# Patient Record
Sex: MALE | Race: BLACK OR AFRICAN AMERICAN | NOT HISPANIC OR LATINO | Employment: UNEMPLOYED | URBAN - METROPOLITAN AREA
[De-identification: names, ages, dates, MRNs, and addresses within clinical notes are randomized per-mention and may not be internally consistent; named-entity substitution may affect disease eponyms.]

---

## 2020-11-23 PROBLEM — F10.121 ALCOHOL INTOXICATION DELIRIUM: Status: ACTIVE | Noted: 2020-11-23

## 2020-11-24 PROBLEM — R56.9 SEIZURE: Status: ACTIVE | Noted: 2020-11-24

## 2020-11-24 PROBLEM — E87.6 HYPOKALEMIA: Status: ACTIVE | Noted: 2020-11-24

## 2020-11-24 PROBLEM — F10.929 ALCOHOL INTOXICATION: Status: ACTIVE | Noted: 2020-11-23

## 2020-11-24 PROBLEM — F10.229 ALCOHOL DEPENDENCE WITH ACUTE ALCOHOLIC INTOXICATION: Status: ACTIVE | Noted: 2020-11-23

## 2020-11-25 PROBLEM — E83.42 HYPOMAGNESEMIA: Status: ACTIVE | Noted: 2020-11-25

## 2022-07-08 ENCOUNTER — HOSPITAL ENCOUNTER (INPATIENT)
Facility: HOSPITAL | Age: 42
LOS: 1 days | Discharge: PSYCHIATRIC OR SUBSTANCE USE FACILITY | DRG: 896 | End: 2022-07-09
Attending: EMERGENCY MEDICINE | Admitting: STUDENT IN AN ORGANIZED HEALTH CARE EDUCATION/TRAINING PROGRAM
Payer: MEDICAID

## 2022-07-08 DIAGNOSIS — N17.9 ACUTE RENAL FAILURE, UNSPECIFIED ACUTE RENAL FAILURE TYPE: ICD-10-CM

## 2022-07-08 DIAGNOSIS — F23 ACUTE PSYCHOSIS: Primary | ICD-10-CM

## 2022-07-08 DIAGNOSIS — E87.6 HYPOKALEMIA: ICD-10-CM

## 2022-07-08 DIAGNOSIS — R07.81 RIB PAIN: ICD-10-CM

## 2022-07-08 DIAGNOSIS — R74.01 TRANSAMINITIS: ICD-10-CM

## 2022-07-08 DIAGNOSIS — R07.9 CHEST PAIN: ICD-10-CM

## 2022-07-08 DIAGNOSIS — M62.82 NON-TRAUMATIC RHABDOMYOLYSIS: ICD-10-CM

## 2022-07-08 PROBLEM — F10.21 HISTORY OF ALCOHOL DEPENDENCE: Status: ACTIVE | Noted: 2022-07-08

## 2022-07-08 LAB
ALBUMIN SERPL BCP-MCNC: 4.4 G/DL (ref 3.5–5.2)
ALP SERPL-CCNC: 87 U/L (ref 55–135)
ALT SERPL W/O P-5'-P-CCNC: 52 U/L (ref 10–44)
AMPHET+METHAMPHET UR QL: ABNORMAL
ANION GAP SERPL CALC-SCNC: 20 MMOL/L (ref 8–16)
APAP SERPL-MCNC: <3 UG/ML (ref 10–20)
AST SERPL-CCNC: 104 U/L (ref 10–40)
BACTERIA #/AREA URNS AUTO: NORMAL /HPF
BARBITURATES UR QL SCN>200 NG/ML: NEGATIVE
BASOPHILS # BLD AUTO: 0.06 K/UL (ref 0–0.2)
BASOPHILS NFR BLD: 0.5 % (ref 0–1.9)
BENZODIAZ UR QL SCN>200 NG/ML: NEGATIVE
BILIRUB DIRECT SERPL-MCNC: 0.5 MG/DL (ref 0.1–0.3)
BILIRUB SERPL-MCNC: 1.4 MG/DL (ref 0.1–1)
BILIRUB UR QL STRIP: NEGATIVE
BUN SERPL-MCNC: 50 MG/DL (ref 6–20)
BZE UR QL SCN: NEGATIVE
CALCIUM SERPL-MCNC: 8.8 MG/DL (ref 8.7–10.5)
CANNABINOIDS UR QL SCN: ABNORMAL
CHLORIDE SERPL-SCNC: 95 MMOL/L (ref 95–110)
CK SERPL-CCNC: 3198 U/L (ref 20–200)
CLARITY UR REFRACT.AUTO: CLEAR
CO2 SERPL-SCNC: 22 MMOL/L (ref 23–29)
COLOR UR AUTO: YELLOW
CREAT SERPL-MCNC: 3.8 MG/DL (ref 0.5–1.4)
CREAT UR-MCNC: 220 MG/DL (ref 23–375)
CREAT UR-MCNC: 220 MG/DL (ref 23–375)
CTP QC/QA: YES
DIFFERENTIAL METHOD: ABNORMAL
EOSINOPHIL # BLD AUTO: 0 K/UL (ref 0–0.5)
EOSINOPHIL NFR BLD: 0.1 % (ref 0–8)
ERYTHROCYTE [DISTWIDTH] IN BLOOD BY AUTOMATED COUNT: 11.9 % (ref 11.5–14.5)
EST. GFR  (AFRICAN AMERICAN): 21.4 ML/MIN/1.73 M^2
EST. GFR  (NON AFRICAN AMERICAN): 18.5 ML/MIN/1.73 M^2
ETHANOL SERPL-MCNC: <10 MG/DL
FERRITIN SERPL-MCNC: 243 NG/ML (ref 20–300)
FOLATE SERPL-MCNC: 10.6 NG/ML (ref 4–24)
GLUCOSE SERPL-MCNC: 71 MG/DL (ref 70–110)
GLUCOSE UR QL STRIP: NEGATIVE
HCT VFR BLD AUTO: 37.5 % (ref 40–54)
HCV AB SERPL QL IA: NEGATIVE
HGB BLD-MCNC: 13 G/DL (ref 14–18)
HGB UR QL STRIP: ABNORMAL
HIV 1+2 AB+HIV1 P24 AG SERPL QL IA: NEGATIVE
HYALINE CASTS UR QL AUTO: 1 /LPF
IMM GRANULOCYTES # BLD AUTO: 0.11 K/UL (ref 0–0.04)
IMM GRANULOCYTES NFR BLD AUTO: 0.8 % (ref 0–0.5)
IRON SERPL-MCNC: 66 UG/DL (ref 45–160)
KETONES UR QL STRIP: ABNORMAL
LEUKOCYTE ESTERASE UR QL STRIP: NEGATIVE
LYMPHOCYTES # BLD AUTO: 1.3 K/UL (ref 1–4.8)
LYMPHOCYTES NFR BLD: 9.8 % (ref 18–48)
MCH RBC QN AUTO: 30.5 PG (ref 27–31)
MCHC RBC AUTO-ENTMCNC: 34.7 G/DL (ref 32–36)
MCV RBC AUTO: 88 FL (ref 82–98)
METHADONE UR QL SCN>300 NG/ML: NEGATIVE
MICROSCOPIC COMMENT: NORMAL
MONOCYTES # BLD AUTO: 1 K/UL (ref 0.3–1)
MONOCYTES NFR BLD: 7.6 % (ref 4–15)
NEUTROPHILS # BLD AUTO: 10.8 K/UL (ref 1.8–7.7)
NEUTROPHILS NFR BLD: 81.2 % (ref 38–73)
NITRITE UR QL STRIP: NEGATIVE
NRBC BLD-RTO: 0 /100 WBC
OPIATES UR QL SCN: NEGATIVE
PCP UR QL SCN>25 NG/ML: NEGATIVE
PH UR STRIP: 5 [PH] (ref 5–8)
PLATELET # BLD AUTO: 305 K/UL (ref 150–450)
PMV BLD AUTO: 8.8 FL (ref 9.2–12.9)
POTASSIUM SERPL-SCNC: 3.1 MMOL/L (ref 3.5–5.1)
PROT SERPL-MCNC: 7.7 G/DL (ref 6–8.4)
PROT UR QL STRIP: ABNORMAL
PROT UR-MCNC: 53 MG/DL (ref 0–15)
PROT/CREAT UR: 0.24 MG/G{CREAT} (ref 0–0.2)
RBC # BLD AUTO: 4.26 M/UL (ref 4.6–6.2)
RBC #/AREA URNS AUTO: 1 /HPF (ref 0–4)
SARS-COV-2 RDRP RESP QL NAA+PROBE: NEGATIVE
SATURATED IRON: 22 % (ref 20–50)
SODIUM SERPL-SCNC: 137 MMOL/L (ref 136–145)
SODIUM UR-SCNC: 59 MMOL/L (ref 20–250)
SP GR UR STRIP: 1.02 (ref 1–1.03)
TOTAL IRON BINDING CAPACITY: 299 UG/DL (ref 250–450)
TOXICOLOGY INFORMATION: ABNORMAL
TRANSFERRIN SERPL-MCNC: 202 MG/DL (ref 200–375)
TSH SERPL DL<=0.005 MIU/L-ACNC: 0.94 UIU/ML (ref 0.4–4)
URN SPEC COLLECT METH UR: ABNORMAL
VIT B12 SERPL-MCNC: 338 PG/ML (ref 210–950)
WBC # BLD AUTO: 13.23 K/UL (ref 3.9–12.7)
WBC #/AREA URNS AUTO: 4 /HPF (ref 0–5)

## 2022-07-08 PROCEDURE — U0002 COVID-19 LAB TEST NON-CDC: HCPCS | Performed by: PHYSICIAN ASSISTANT

## 2022-07-08 PROCEDURE — 25000003 PHARM REV CODE 250: Performed by: PHYSICIAN ASSISTANT

## 2022-07-08 PROCEDURE — 80307 DRUG TEST PRSMV CHEM ANLYZR: CPT

## 2022-07-08 PROCEDURE — 87389 HIV-1 AG W/HIV-1&-2 AB AG IA: CPT | Performed by: PHYSICIAN ASSISTANT

## 2022-07-08 PROCEDURE — 86803 HEPATITIS C AB TEST: CPT | Performed by: PHYSICIAN ASSISTANT

## 2022-07-08 PROCEDURE — 80053 COMPREHEN METABOLIC PANEL: CPT | Performed by: PHYSICIAN ASSISTANT

## 2022-07-08 PROCEDURE — 85025 COMPLETE CBC W/AUTO DIFF WBC: CPT | Performed by: PHYSICIAN ASSISTANT

## 2022-07-08 PROCEDURE — 99223 1ST HOSP IP/OBS HIGH 75: CPT | Mod: ,,, | Performed by: STUDENT IN AN ORGANIZED HEALTH CARE EDUCATION/TRAINING PROGRAM

## 2022-07-08 PROCEDURE — 80307 DRUG TEST PRSMV CHEM ANLYZR: CPT | Performed by: PHYSICIAN ASSISTANT

## 2022-07-08 PROCEDURE — 82607 VITAMIN B-12: CPT | Performed by: STUDENT IN AN ORGANIZED HEALTH CARE EDUCATION/TRAINING PROGRAM

## 2022-07-08 PROCEDURE — 25000003 PHARM REV CODE 250: Performed by: INTERNAL MEDICINE

## 2022-07-08 PROCEDURE — 99223 PR INITIAL HOSPITAL CARE,LEVL III: ICD-10-PCS | Mod: ,,, | Performed by: STUDENT IN AN ORGANIZED HEALTH CARE EDUCATION/TRAINING PROGRAM

## 2022-07-08 PROCEDURE — 84300 ASSAY OF URINE SODIUM: CPT | Performed by: STUDENT IN AN ORGANIZED HEALTH CARE EDUCATION/TRAINING PROGRAM

## 2022-07-08 PROCEDURE — 99291 CRITICAL CARE FIRST HOUR: CPT

## 2022-07-08 PROCEDURE — 82728 ASSAY OF FERRITIN: CPT | Performed by: STUDENT IN AN ORGANIZED HEALTH CARE EDUCATION/TRAINING PROGRAM

## 2022-07-08 PROCEDURE — 99291 CRITICAL CARE FIRST HOUR: CPT | Mod: CS,,, | Performed by: PHYSICIAN ASSISTANT

## 2022-07-08 PROCEDURE — 82746 ASSAY OF FOLIC ACID SERUM: CPT | Performed by: STUDENT IN AN ORGANIZED HEALTH CARE EDUCATION/TRAINING PROGRAM

## 2022-07-08 PROCEDURE — 99222 PR INITIAL HOSPITAL CARE,LEVL II: ICD-10-PCS | Mod: AF,HB,, | Performed by: PSYCHIATRY & NEUROLOGY

## 2022-07-08 PROCEDURE — 84156 ASSAY OF PROTEIN URINE: CPT | Performed by: STUDENT IN AN ORGANIZED HEALTH CARE EDUCATION/TRAINING PROGRAM

## 2022-07-08 PROCEDURE — 80143 DRUG ASSAY ACETAMINOPHEN: CPT | Performed by: PHYSICIAN ASSISTANT

## 2022-07-08 PROCEDURE — 81001 URINALYSIS AUTO W/SCOPE: CPT | Performed by: PHYSICIAN ASSISTANT

## 2022-07-08 PROCEDURE — 99291 PR CRITICAL CARE, E/M 30-74 MINUTES: ICD-10-PCS | Mod: CS,,, | Performed by: PHYSICIAN ASSISTANT

## 2022-07-08 PROCEDURE — 84466 ASSAY OF TRANSFERRIN: CPT | Performed by: STUDENT IN AN ORGANIZED HEALTH CARE EDUCATION/TRAINING PROGRAM

## 2022-07-08 PROCEDURE — 11000001 HC ACUTE MED/SURG PRIVATE ROOM

## 2022-07-08 PROCEDURE — 25000003 PHARM REV CODE 250

## 2022-07-08 PROCEDURE — 99222 1ST HOSP IP/OBS MODERATE 55: CPT | Mod: AF,HB,, | Performed by: PSYCHIATRY & NEUROLOGY

## 2022-07-08 PROCEDURE — 84443 ASSAY THYROID STIM HORMONE: CPT | Performed by: PHYSICIAN ASSISTANT

## 2022-07-08 PROCEDURE — 82077 ASSAY SPEC XCP UR&BREATH IA: CPT | Performed by: PHYSICIAN ASSISTANT

## 2022-07-08 PROCEDURE — 82248 BILIRUBIN DIRECT: CPT | Performed by: STUDENT IN AN ORGANIZED HEALTH CARE EDUCATION/TRAINING PROGRAM

## 2022-07-08 PROCEDURE — 84425 ASSAY OF VITAMIN B-1: CPT | Performed by: STUDENT IN AN ORGANIZED HEALTH CARE EDUCATION/TRAINING PROGRAM

## 2022-07-08 PROCEDURE — 82550 ASSAY OF CK (CPK): CPT | Performed by: PHYSICIAN ASSISTANT

## 2022-07-08 RX ORDER — GLUCAGON 1 MG
1 KIT INJECTION
Status: DISCONTINUED | OUTPATIENT
Start: 2022-07-08 | End: 2022-07-09 | Stop reason: HOSPADM

## 2022-07-08 RX ORDER — SODIUM CHLORIDE 0.9 % (FLUSH) 0.9 %
10 SYRINGE (ML) INJECTION EVERY 12 HOURS PRN
Status: DISCONTINUED | OUTPATIENT
Start: 2022-07-08 | End: 2022-07-09 | Stop reason: HOSPADM

## 2022-07-08 RX ORDER — IBUPROFEN 200 MG
24 TABLET ORAL
Status: DISCONTINUED | OUTPATIENT
Start: 2022-07-08 | End: 2022-07-09 | Stop reason: HOSPADM

## 2022-07-08 RX ORDER — LORAZEPAM 1 MG/1
2 TABLET ORAL EVERY 6 HOURS PRN
Status: DISCONTINUED | OUTPATIENT
Start: 2022-07-08 | End: 2022-07-09 | Stop reason: HOSPADM

## 2022-07-08 RX ORDER — ACETAMINOPHEN 325 MG/1
650 TABLET ORAL
Status: COMPLETED | OUTPATIENT
Start: 2022-07-08 | End: 2022-07-08

## 2022-07-08 RX ORDER — SODIUM CHLORIDE 9 MG/ML
INJECTION, SOLUTION INTRAVENOUS CONTINUOUS
Status: DISCONTINUED | OUTPATIENT
Start: 2022-07-08 | End: 2022-07-09 | Stop reason: HOSPADM

## 2022-07-08 RX ORDER — ACETAMINOPHEN 325 MG/1
650 TABLET ORAL EVERY 4 HOURS PRN
Status: DISCONTINUED | OUTPATIENT
Start: 2022-07-08 | End: 2022-07-09 | Stop reason: HOSPADM

## 2022-07-08 RX ORDER — NALOXONE HCL 0.4 MG/ML
0.02 VIAL (ML) INJECTION
Status: DISCONTINUED | OUTPATIENT
Start: 2022-07-08 | End: 2022-07-09 | Stop reason: HOSPADM

## 2022-07-08 RX ORDER — OLANZAPINE 10 MG/2ML
10 INJECTION, POWDER, FOR SOLUTION INTRAMUSCULAR ONCE AS NEEDED
Status: DISCONTINUED | OUTPATIENT
Start: 2022-07-08 | End: 2022-07-08

## 2022-07-08 RX ORDER — IBUPROFEN 200 MG
16 TABLET ORAL
Status: DISCONTINUED | OUTPATIENT
Start: 2022-07-08 | End: 2022-07-09 | Stop reason: HOSPADM

## 2022-07-08 RX ORDER — OLANZAPINE 5 MG/1
5 TABLET, ORALLY DISINTEGRATING ORAL EVERY 8 HOURS PRN
Status: DISCONTINUED | OUTPATIENT
Start: 2022-07-08 | End: 2022-07-09 | Stop reason: HOSPADM

## 2022-07-08 RX ORDER — FOLIC ACID 1 MG/1
1 TABLET ORAL DAILY
Status: DISCONTINUED | OUTPATIENT
Start: 2022-07-08 | End: 2022-07-08

## 2022-07-08 RX ORDER — THIAMINE HCL 100 MG
100 TABLET ORAL DAILY
Status: DISCONTINUED | OUTPATIENT
Start: 2022-07-08 | End: 2022-07-08

## 2022-07-08 RX ORDER — ONDANSETRON 8 MG/1
8 TABLET, ORALLY DISINTEGRATING ORAL EVERY 8 HOURS PRN
Status: DISCONTINUED | OUTPATIENT
Start: 2022-07-08 | End: 2022-07-08

## 2022-07-08 RX ORDER — OLANZAPINE 10 MG/2ML
2.5 INJECTION, POWDER, FOR SOLUTION INTRAMUSCULAR ONCE AS NEEDED
Status: DISCONTINUED | OUTPATIENT
Start: 2022-07-08 | End: 2022-07-09 | Stop reason: HOSPADM

## 2022-07-08 RX ORDER — POTASSIUM CHLORIDE 20 MEQ/1
40 TABLET, EXTENDED RELEASE ORAL ONCE
Status: COMPLETED | OUTPATIENT
Start: 2022-07-08 | End: 2022-07-08

## 2022-07-08 RX ADMIN — SODIUM CHLORIDE: 0.9 INJECTION, SOLUTION INTRAVENOUS at 04:07

## 2022-07-08 RX ADMIN — SODIUM CHLORIDE 1000 ML: 0.9 INJECTION, SOLUTION INTRAVENOUS at 12:07

## 2022-07-08 RX ADMIN — POTASSIUM CHLORIDE 40 MEQ: 1500 TABLET, EXTENDED RELEASE ORAL at 12:07

## 2022-07-08 RX ADMIN — ACETAMINOPHEN 650 MG: 325 TABLET ORAL at 12:07

## 2022-07-08 NOTE — ED NOTES
Bed: [facility]  Expected date:   Expected time:   Means of arrival:   Comments:

## 2022-07-08 NOTE — ED NOTES
Patient transferred from [facility] to ED bed 20.  Changed into blue paper scrubs and scanned by security at bedside.  Patient is AAOX4, in NAD, denies SI/HI. IM MD at bedside speaking to patient.  Calm and cooperative at this time. Belongings documented and locked in chacon closet and safe in [place] office. Sitter at bedside in direct visual contact.

## 2022-07-08 NOTE — ED NOTES
Patient identifiers verified and correct for MR Morales  C/C: rib pain SEE NN  APPEARANCE: awake and alert in NAD.  SKIN: warm, dry and intact. No breakdown or bruising.  MUSCULOSKELETAL: Patient moving all extremities spontaneously, no obvious swelling or deformities noted. Ambulates independently.  RESPIRATORY: Denies shortness of breath.Respirations unlabored.   CARDIAC: Denies CP, 2+ distal pulses; no peripheral edema  ABDOMEN: S/ND/NT, Denies nausea  : voids spontaneously, denies difficulty  Neurologic: AAO x 4; follows commands equal strength in all extremities; denies numbness/tingling. Denies dizziness  Denies weakness

## 2022-07-08 NOTE — HOSPITAL COURSE
7/8/22: Patient was seen in ED; pleasantly psychotic, cooperative with care, c/o abdominal/flank pain as well as anxiety 2/2 paranoia. Alert and oriented x3.

## 2022-07-08 NOTE — CONSULTS
Pt seen and staffed please see note below.    -Please contact ON CALL psychiatry service for any acute issues that may arise.    KAIT PALMA MD   Department of Psychiatry   [facility]  7/8/2022 4:28 PM

## 2022-07-08 NOTE — SUBJECTIVE & OBJECTIVE
History reviewed. No pertinent past medical history.    History reviewed. No pertinent surgical history.    Review of patient's allergies indicates:  No Known Allergies    No current facility-administered medications on file prior to encounter.     No current outpatient medications on file prior to encounter.     Family History       Family history is unknown by patient.          Tobacco Use    Smoking status: Never Smoker    Smokeless tobacco: Never Used   Substance and Sexual Activity    Alcohol use: Not Currently     Comment: Quit 3 years ago    Drug use: Yes     Types: Marijuana    Sexual activity: Not on file     Review of Systems   Constitutional:  Positive for activity change and fatigue.   HENT:  Negative for sinus pain and sore throat.    Eyes:  Negative for pain and visual disturbance.   Respiratory:  Negative for chest tightness and shortness of breath.    Cardiovascular:  Negative for chest pain and leg swelling.   Gastrointestinal:  Positive for abdominal pain and nausea.   Genitourinary:  Positive for flank pain. Negative for difficulty urinating.   Musculoskeletal:         R chest wall to flank pain   Skin:  Negative for color change and rash.   Neurological:  Negative for dizziness.   Psychiatric/Behavioral:  Positive for hallucinations. Negative for self-injury. The patient is not nervous/anxious.    Objective:     Vital Signs (Most Recent):  Temp: 98.3 °F (36.8 °C) (07/08/22 1342)  Pulse: 60 (07/08/22 1342)  Resp: 16 (07/08/22 1342)  BP: (!) 141/65 (07/08/22 1342)  SpO2: 100 % (07/08/22 1342)   Vital Signs (24h Range):  Temp:  [98.3 °F (36.8 °C)-98.4 °F (36.9 °C)] 98.3 °F (36.8 °C)  Pulse:  [60-82] 60  Resp:  [16-18] 16  SpO2:  [99 %-100 %] 100 %  BP: (132-141)/(65-66) 141/65     Weight: 61.7 kg (136 lb)  There is no height or weight on file to calculate BMI.    Physical Exam  Constitutional:       General: He is not in acute distress.     Appearance: He is not ill-appearing or toxic-appearing.    HENT:      Head: Normocephalic and atraumatic.      Right Ear: External ear normal.      Left Ear: External ear normal.      Nose: Nose normal.      Mouth/Throat:      Pharynx: Oropharynx is clear.   Eyes:      General: No scleral icterus.     Conjunctiva/sclera: Conjunctivae normal.   Cardiovascular:      Rate and Rhythm: Normal rate and regular rhythm.      Pulses: Normal pulses.      Heart sounds: Normal heart sounds.   Pulmonary:      Effort: Pulmonary effort is normal.      Breath sounds: Normal breath sounds.   Abdominal:      General: Bowel sounds are normal.      Palpations: Abdomen is soft.      Tenderness: There is abdominal tenderness. There is no guarding.      Comments: RUQ tenderness   Musculoskeletal:         General: Tenderness present. Normal range of motion.      Cervical back: Normal range of motion.      Comments: TTP at R axilla and ribs   Skin:     General: Skin is warm and dry.   Neurological:      General: No focal deficit present.   Psychiatric:      Comments: Hallucinations           Significant Labs: Bilirubin:   Recent Labs   Lab 07/08/22  0921 07/08/22  1239   BILIDIR  --  0.5*   BILITOT 1.4*  --      BMP:   Recent Labs   Lab 07/08/22  0921   GLU 71      K 3.1*   CL 95   CO2 22*   BUN 50*   CREATININE 3.8*   CALCIUM 8.8         Significant Imaging: I have reviewed all pertinent imaging results/findings within the past 24 hours.

## 2022-07-08 NOTE — ASSESSMENT & PLAN NOTE
ASSESSMENT     Boo Morales is a 41 y.o. male with a past psychiatric history of alcohol use disorder, who presented to the [facility] due to rib pain and paranoia.    IMPRESSION  Per collateral and interview, the patient's paranoid delusions and auditory hallucinations have had an acute onset. He does not have primary psychiatric history but does have a history of substance use, and toxicology screen was positive for THC and amphetamines today. 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 He may require symptomatic PRNs as below for safety, but we recommend deferring scheduled antipsychotics until he has been given more time for substance washout.    RECOMMENDATION(S)      1. Scheduled Medication(s):  - Defer scheduled medications at this time as patient appears to be psychotic secondary to amphetamine use and will likely return to baseline with substance washout. If he does not begin to improve or is requiring frequent PRNs for non-redirectable agitation, can consider scheduling an antipsychotic.  - Given poor recent nutrition, rhabdomyolysis, and substance use history, consider administering IV banana bag (thiamine, dextrose, saline) vs correcting electrolytes as needed.    2. PRN Medication(s):  - Ativan 2 mg PO/IM q4hrs PRN CIWA parameters to prevent withdrawal  - Zyprexa 10 mg PO/IM q8hrs PRN non-redirectable agitation due to psychosis in order to help the patient more effectively interact with his environment    3.  Monitor:  CMP, CK    4. Legal Status/Precaution(s):  Recommend to continue PEC as patient is gravely disabled due to a psychiatric illness. Please have charge nurse call the  on Daniel July 10 as pt's PEC expires Monday 7/11.    This case was discussed with Dr. Hernández. We will continue to see the patient and make recommendations. Thank you for the consult - please feel free to reach out with any questions/concerns.
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
## 2022-07-08 NOTE — ASSESSMENT & PLAN NOTE
Patient with no reported psychiatric history  - +AH, +tactile and + olfactory hallucinations  - Utox prelim positive for amphetamines and THC  - blood tox screen pending  - IP consult to psych    Suspect element of post-ictal state with olfactory disturbances, rhabdo and hx of substance-induced seizures

## 2022-07-08 NOTE — SUBJECTIVE & OBJECTIVE
Patient History               Medical as of 7/8/2022    Past Medical History: Patient provided no pertinent medical history.               Surgical as of 7/8/2022    Past Surgical History: Patient provided no pertinent surgical history.               Family as of 7/8/2022    Family history is unknown by patient.               Tobacco Use as of 7/8/2022       Smoking Status Smoking Start Date Smoking Quit Date Packs/Day Years Used    Never Smoker -- -- -- --      Types Comments Smokeless Tobacco Status Smokeless Tobacco Quit Date Source    -- -- Never Used -- Provider                  Alcohol Use as of 7/8/2022       Alcohol Use Drinks/Week Alcohol/Week Comments Source    Not Currently   -- Quit 3 years ago Provider                  Drug Use as of 7/8/2022       Drug Use Types Frequency Comments Source    Yes  Marijuana -- -- Provider                  Sexual Activity as of 7/8/2022    None               Activities of Daily Living as of 7/8/2022    None               Social Documentation as of 7/8/2022    None               Occupational as of 7/8/2022    None               Socioeconomic as of 7/8/2022       Marital Status Spouse Name Number of Children Years Education Education Level Preferred Language Ethnicity Race Source    Single -- -- -- -- English Not  or /a Black or  --                  Pertinent History       Question Response Comments    Lives with -- --    Place in Birth Order -- --    Lives in -- --    Number of Siblings -- --    Raised by -- --    Legal Involvement -- --    Childhood Trauma -- --    Criminal History of -- --    Financial Status -- --    Highest Level of Education -- --    Does patient have access to a firearm? -- --     Service -- --    Primary Leisure Activity -- --    Spirituality -- --          History reviewed. No pertinent past medical history.  History reviewed. No pertinent surgical history.  Family History       Family history is unknown  by patient.          Tobacco Use    Smoking status: Never Smoker    Smokeless tobacco: Never Used   Substance and Sexual Activity    Alcohol use: Not Currently     Comment: Quit 3 years ago    Drug use: Yes     Types: Marijuana    Sexual activity: Not on file     Review of patient's allergies indicates:  No Known Allergies    No current facility-administered medications on file prior to encounter.     No current outpatient medications on file prior to encounter.     Psychotherapeutics (From admission, onward)                Start     Stop Route Frequency Ordered    07/08/22 1543  OLANZapine injection 2.5 mg  (Psych Routine Orders)         12/04 0643 IM Once as needed 07/08/22 1543    07/08/22 1307  LORazepam tablet 2 mg         -- Oral Every 6 hours PRN 07/08/22 1210          Review of Systems   Constitutional:  Positive for activity change and appetite change.   Gastrointestinal:  Positive for abdominal pain.   Genitourinary:  Positive for decreased urine volume and flank pain.   Musculoskeletal:  Positive for myalgias.   Psychiatric/Behavioral:  Positive for confusion, decreased concentration, hallucinations and sleep disturbance. Negative for suicidal ideas. The patient is nervous/anxious and is hyperactive.    Strengths and Liabilities: Strength: Patient is expressive/articulate., Strength: Patient has positive support network.    Objective:     Vital Signs (Most Recent):  Temp: 98.3 °F (36.8 °C) (07/08/22 1342)  Pulse: 60 (07/08/22 1342)  Resp: 16 (07/08/22 1342)  BP: (!) 141/65 (07/08/22 1342)  SpO2: 100 % (07/08/22 1342)   Vital Signs (24h Range):  Temp:  [98.3 °F (36.8 °C)-98.4 °F (36.9 °C)] 98.3 °F (36.8 °C)  Pulse:  [60-82] 60  Resp:  [16-18] 16  SpO2:  [99 %-100 %] 100 %  BP: (132-141)/(65-66) 141/65        Weight: 61.7 kg (136 lb)  There is no height or weight on file to calculate BMI.      Intake/Output Summary (Last 24 hours) at 7/8/2022 1614  Last data filed at 7/8/2022 1346  Gross per 24 hour    Intake 1000 ml   Output --   Net 1000 ml       Physical Exam  Neurological:      Mental Status: He is oriented to person, place, and time.   Psychiatric:         Speech: Speech normal.     NEUROLOGICAL EXAMINATION:     MENTAL STATUS   Oriented to person, place, and time.   Follows 2 step commands.   Attention: normal. Concentration: normal.   Speech: speech is normal   Level of consciousness: alert  Knowledge: consistent with education.   Normal comprehension.   Significant Labs: Last 24 Hours:   Recent Lab Results         07/08/22  1345   07/08/22  1239   07/08/22  0936   07/08/22  0921        Benzodiazepines Negative             Methadone metabolites Negative             Phencyclidine Negative             Acetaminophen (Tylenol), Serum       <3.0  Comment: Toxic Levels:  Adults (4 hr post-ingestion).........>150 ug/mL  Adults (12 hr post-ingestion)........>40 ug/mL  Peds (2 hr post-ingestion, liquid)...>225 ug/mL         Albumin       4.4       Alcohol, Serum       <10       Alkaline Phosphatase       87       ALT       52       Amphetamine Screen, Ur Presumptive Positive             Anion Gap       20       Appearance, UA Clear             AST       104       Bacteria, UA Rare             Barbiturate Screen, Ur Negative             Baso #       0.06       Basophil %       0.5       Bilirubin (UA) Negative             Bilirubin, Direct   0.5           BILIRUBIN TOTAL       1.4  Comment: For infants and newborns, interpretation of results should be based  on gestational age, weight and in agreement with clinical  observations.    Premature Infant recommended reference ranges:  Up to 24 hours.............<8.0 mg/dL  Up to 48 hours............<12.0 mg/dL  3-5 days..................<15.0 mg/dL  6-29 days.................<15.0 mg/dL         BUN       50       Calcium       8.8       Chloride       95       CO2       22       Cocaine (Metab.) Negative             Color, UA Yellow             CPK       3198        Creatinine       3.8       Creatinine, Urine 220.0              220.0             Differential Method       Automated       eGFR if        21.4       eGFR if non        18.5  Comment: Calculation used to obtain the estimated glomerular filtration  rate (eGFR) is the CKD-EPI equation.          Eos #       0.0       Eosinophil %       0.1       Ferritin   243           Folate   10.6           Glucose       71       Glucose, UA Negative             Gran # (ANC)       10.8       Gran %       81.2       Hematocrit       37.5       Hemoglobin       13.0       Hepatitis C Ab       Negative       HIV 1/2 Ag/Ab       Negative       Hyaline Casts, UA 1             Immature Grans (Abs)       0.11  Comment: Mild elevation in immature granulocytes is non specific and   can be seen in a variety of conditions including stress response,   acute inflammation, trauma and pregnancy. Correlation with other   laboratory and clinical findings is essential.         Immature Granulocytes       0.8       Iron   66           Ketones, UA Trace             Leukocytes, UA Negative             Lymph #       1.3       Lymph %       9.8       MCH       30.5       MCHC       34.7       MCV       88       Microscopic Comment SEE COMMENT  Comment: Other formed elements not mentioned in the report are not   present in the microscopic examination.                Mono #       1.0       Mono %       7.6       MPV       8.8       NITRITE UA Negative             nRBC       0       Occult Blood UA 2+             Opiate Scrn, Ur Negative             pH, UA 5.0             Platelets       305       Potassium       3.1       Prot/Creat Ratio, Urine 0.24             PROTEIN TOTAL       7.7       Protein, UA 1+  Comment: Recommend a 24 hour urine protein or a urine   protein/creatinine ratio if globulin induced proteinuria is  clinically suspected.               Protein, Urine Random 53              Acceptable      Yes         RBC       4.26       RBC, UA 1             RDW       11.9       SARS-CoV-2 RNA, Amplification, Qual     Negative         Saturated Iron   22           Sodium       137       Sodium, Urine 59  Comment: The random urine reference ranges provided were established   for 24 hour urine collections.  No reference ranges exist for  random urine specimens.  Correlate clinically.               Specific Gravity, UA 1.020             Specimen UA Urine, Clean Catch             Marijuana (THC) Metabolite Presumptive Positive             TIBC   299           Toxicology Information SEE COMMENT  Comment: This screen includes the following classes of drugs at the listed   cut-off:    Benzodiazepines 200 ng/ml  Methadone 300 ng/ml  Cocaine metabolite 300 ng/ml  Opiates 300 ng/ml  Barbiturates 200 ng/ml  Amphetamines 1000 ng/ml  Marijuana metabs (THC) 50 ng/ml  Phencyclidine (PCP) 25 ng/ml    This is a screening test. If results do not correlate with clinical   presentation, then a confirmatory send out test is advised.     This report is intended for use in clinical monitoring and management   of   patients. It is not intended for use in employment related drug   testing.               Transferrin   202           TSH       0.942       Vitamin B-12   338           WBC, UA 4             WBC       13.23               Significant Imaging: I have reviewed all pertinent imaging results/findings within the past 24 hours.

## 2022-07-08 NOTE — ASSESSMENT & PLAN NOTE
Cr on admission of 3.8, baseline of 0.7  - Suspect 2/2 rhabdo with CPK of 3198  - Treating with IVF  - Daily CPK  - Daily CMP  - Urine studies pending  - Monitor UOP  - Retroperitoneal US to assess for possible post renal obstruction - pending  - No acute indications for dialysis
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
## 2022-07-08 NOTE — ASSESSMENT & PLAN NOTE
Admit CPK of 3191  - patient complains of leg pains  - poor PO intake for days prior to admission    See acute renal failure for treatment plan

## 2022-07-09 VITALS
HEIGHT: 72 IN | WEIGHT: 100 LBS | RESPIRATION RATE: 20 BRPM | OXYGEN SATURATION: 99 % | SYSTOLIC BLOOD PRESSURE: 129 MMHG | TEMPERATURE: 97 F | HEART RATE: 71 BPM | DIASTOLIC BLOOD PRESSURE: 62 MMHG | BODY MASS INDEX: 13.54 KG/M2

## 2022-07-09 LAB
ALBUMIN SERPL BCP-MCNC: 3.2 G/DL (ref 3.5–5.2)
ALP SERPL-CCNC: 69 U/L (ref 55–135)
ALT SERPL W/O P-5'-P-CCNC: 36 U/L (ref 10–44)
ANION GAP SERPL CALC-SCNC: 9 MMOL/L (ref 8–16)
AST SERPL-CCNC: 62 U/L (ref 10–40)
BASOPHILS # BLD AUTO: 0.04 K/UL (ref 0–0.2)
BASOPHILS NFR BLD: 0.5 % (ref 0–1.9)
BILIRUB SERPL-MCNC: 1 MG/DL (ref 0.1–1)
BUN SERPL-MCNC: 51 MG/DL (ref 6–20)
CALCIUM SERPL-MCNC: 8.1 MG/DL (ref 8.7–10.5)
CHLORIDE SERPL-SCNC: 108 MMOL/L (ref 95–110)
CK SERPL-CCNC: 1476 U/L (ref 20–200)
CO2 SERPL-SCNC: 23 MMOL/L (ref 23–29)
CREAT SERPL-MCNC: 2.2 MG/DL (ref 0.5–1.4)
DIFFERENTIAL METHOD: ABNORMAL
EOSINOPHIL # BLD AUTO: 0 K/UL (ref 0–0.5)
EOSINOPHIL NFR BLD: 0.5 % (ref 0–8)
ERYTHROCYTE [DISTWIDTH] IN BLOOD BY AUTOMATED COUNT: 12 % (ref 11.5–14.5)
EST. GFR  (AFRICAN AMERICAN): 41.5 ML/MIN/1.73 M^2
EST. GFR  (NON AFRICAN AMERICAN): 35.9 ML/MIN/1.73 M^2
GLUCOSE SERPL-MCNC: 85 MG/DL (ref 70–110)
HCT VFR BLD AUTO: 31.9 % (ref 40–54)
HGB BLD-MCNC: 11.1 G/DL (ref 14–18)
IMM GRANULOCYTES # BLD AUTO: 0.04 K/UL (ref 0–0.04)
IMM GRANULOCYTES NFR BLD AUTO: 0.5 % (ref 0–0.5)
LYMPHOCYTES # BLD AUTO: 1.7 K/UL (ref 1–4.8)
LYMPHOCYTES NFR BLD: 19.5 % (ref 18–48)
MAGNESIUM SERPL-MCNC: 1.7 MG/DL (ref 1.6–2.6)
MCH RBC QN AUTO: 31.2 PG (ref 27–31)
MCHC RBC AUTO-ENTMCNC: 34.8 G/DL (ref 32–36)
MCV RBC AUTO: 90 FL (ref 82–98)
MONOCYTES # BLD AUTO: 0.8 K/UL (ref 0.3–1)
MONOCYTES NFR BLD: 9.2 % (ref 4–15)
NEUTROPHILS # BLD AUTO: 5.9 K/UL (ref 1.8–7.7)
NEUTROPHILS NFR BLD: 69.8 % (ref 38–73)
NRBC BLD-RTO: 0 /100 WBC
PHOSPHATE SERPL-MCNC: 3 MG/DL (ref 2.7–4.5)
PLATELET # BLD AUTO: 286 K/UL (ref 150–450)
PMV BLD AUTO: 9.4 FL (ref 9.2–12.9)
POTASSIUM SERPL-SCNC: 3.2 MMOL/L (ref 3.5–5.1)
PROT SERPL-MCNC: 5.6 G/DL (ref 6–8.4)
RBC # BLD AUTO: 3.56 M/UL (ref 4.6–6.2)
SODIUM SERPL-SCNC: 140 MMOL/L (ref 136–145)
WBC # BLD AUTO: 8.48 K/UL (ref 3.9–12.7)

## 2022-07-09 PROCEDURE — 99239 PR HOSPITAL DISCHARGE DAY,>30 MIN: ICD-10-PCS | Mod: ,,, | Performed by: STUDENT IN AN ORGANIZED HEALTH CARE EDUCATION/TRAINING PROGRAM

## 2022-07-09 PROCEDURE — 36415 COLL VENOUS BLD VENIPUNCTURE: CPT

## 2022-07-09 PROCEDURE — 82550 ASSAY OF CK (CPK): CPT

## 2022-07-09 PROCEDURE — 84100 ASSAY OF PHOSPHORUS: CPT

## 2022-07-09 PROCEDURE — 80053 COMPREHEN METABOLIC PANEL: CPT

## 2022-07-09 PROCEDURE — 85025 COMPLETE CBC W/AUTO DIFF WBC: CPT

## 2022-07-09 PROCEDURE — 99232 SBSQ HOSP IP/OBS MODERATE 35: CPT | Mod: HB,AF,, | Performed by: PSYCHIATRY & NEUROLOGY

## 2022-07-09 PROCEDURE — 99232 PR SUBSEQUENT HOSPITAL CARE,LEVL II: ICD-10-PCS | Mod: HB,AF,, | Performed by: PSYCHIATRY & NEUROLOGY

## 2022-07-09 PROCEDURE — 25000003 PHARM REV CODE 250: Performed by: STUDENT IN AN ORGANIZED HEALTH CARE EDUCATION/TRAINING PROGRAM

## 2022-07-09 PROCEDURE — 99239 HOSP IP/OBS DSCHRG MGMT >30: CPT | Mod: ,,, | Performed by: STUDENT IN AN ORGANIZED HEALTH CARE EDUCATION/TRAINING PROGRAM

## 2022-07-09 PROCEDURE — 83735 ASSAY OF MAGNESIUM: CPT

## 2022-07-09 PROCEDURE — 25000003 PHARM REV CODE 250

## 2022-07-09 RX ORDER — FOLIC ACID 1 MG/1
1 TABLET ORAL DAILY
Qty: 360 TABLET | Refills: 0
Start: 2022-07-10 | End: 2022-07-15

## 2022-07-09 RX ORDER — DIVALPROEX SODIUM 250 MG/1
250 TABLET, DELAYED RELEASE ORAL EVERY 12 HOURS
Status: DISCONTINUED | OUTPATIENT
Start: 2022-07-09 | End: 2022-07-09 | Stop reason: HOSPADM

## 2022-07-09 RX ORDER — LANOLIN ALCOHOL/MO/W.PET/CERES
100 CREAM (GRAM) TOPICAL DAILY
Qty: 30 TABLET | Refills: 11 | Status: ON HOLD
Start: 2022-07-10 | End: 2022-07-15 | Stop reason: HOSPADM

## 2022-07-09 RX ORDER — THIAMINE HCL 100 MG
100 TABLET ORAL DAILY
Status: DISCONTINUED | OUTPATIENT
Start: 2022-07-09 | End: 2022-07-09 | Stop reason: HOSPADM

## 2022-07-09 RX ORDER — DIVALPROEX SODIUM 250 MG/1
250 TABLET, DELAYED RELEASE ORAL EVERY 12 HOURS
Qty: 60 TABLET | Refills: 11
Start: 2022-07-09 | End: 2022-07-15

## 2022-07-09 RX ORDER — FOLIC ACID 1 MG/1
1 TABLET ORAL DAILY
Status: DISCONTINUED | OUTPATIENT
Start: 2022-07-09 | End: 2022-07-09 | Stop reason: HOSPADM

## 2022-07-09 RX ADMIN — FOLIC ACID 1 MG: 1 TABLET ORAL at 09:07

## 2022-07-09 RX ADMIN — POTASSIUM BICARBONATE 50 MEQ: 978 TABLET, EFFERVESCENT ORAL at 09:07

## 2022-07-09 RX ADMIN — SODIUM CHLORIDE: 0.9 INJECTION, SOLUTION INTRAVENOUS at 01:07

## 2022-07-09 RX ADMIN — THIAMINE HCL TAB 100 MG 100 MG: 100 TAB at 09:07

## 2022-07-09 RX ADMIN — DIVALPROEX SODIUM 250 MG: 250 TABLET, DELAYED RELEASE ORAL at 03:07
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
## 2022-07-09 NOTE — NURSING
Pt is AAOx4, VVS, on RA. Has not c/o auditory hallucinations. Pt stated he feels better being here and around people and that when he is alone is when he begins to be paranoid and scared. Has slept most of day, stated he just feels very tired. On reg diet, has been eating all of the food on tray and requesting snacks.     Ambulates well, has general weakness. L forearm is swollen due to IV infiltration. Otherwise, skin is c/d/i. IV is in R  FA. Fluids were dc'd when transport picked up pt.      Called and gave report to [facility] Psych [facility] @ 1438.

## 2022-07-09 NOTE — NURSING
"When the transport arrived to pick the pt up, he then voiced concern about his family not knowing where he is going and being worried about going to another place. He appears to be very fearful and scared about why his mind is making him feel this way.     He stated " I don't do bad things. I don't drink, hell I even stopped smoking cigaretts 2 years ago. This will making you want a cigarette." It took some persuading to get the pt on the stretcher to leave, but we were able to. Pt never became violent or aggressive. Just very upset and scared about the unknown.     "
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
## 2022-07-09 NOTE — PLAN OF CARE
07/09/22 1207   Post-Acute Status   Post-Acute Authorization Placement   Post-Acute Placement Status Referrals Sent   Discharge Plan   Discharge Plan A Psychiatric hospital

## 2022-07-09 NOTE — ASSESSMENT & PLAN NOTE
ASSESSMENT     Boo Morales is a 41 y.o. male with a past psychiatric history of alcohol use disorder, who [place] the [facility] [place] to [place] pain and paranoia.    IMPRESSION  Per collateral and interview, the patient's paranoid delusions and auditory hallucinations have had an acute onset. He does not have primary psychiatric history but does have a history of substance use, and toxicology screen was positive for THC and amphetamines today. 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 He may require symptomatic PRNs as below for safety, but we recommend deferring scheduled antipsychotics until he has been given more time for substance washout.    RECOMMENDATION(S)      1. Scheduled Medication(s):  - Initiate Depakote 250 mg PO q8h.  - Given poor recent nutrition, rhabdomyolysis, and substance use history, consider administering IV banana bag (thiamine, dextrose, saline) vs correcting electrolytes as needed.    2. PRN Medication(s):  - Ativan 2 mg PO/IM q4hrs PRN CIWA parameters to prevent withdrawal  - Zyprexa 10 mg PO/IM q8hrs PRN non-redirectable agitation due to psychosis in order to help the patient more effectively interact with his environment    3.  Monitor:  CMP, CK    4. Legal Status/Precaution(s):  Recommend to continue PEC as patient is gravely disabled due to a psychiatric illness.Transfer to inpatient psychiatric facility when medically cleared.     This case was discussed with Dr. Brito. We will sign off. Please call with any questions or concerns.

## 2022-07-09 NOTE — PLAN OF CARE
"SW notified by transfer center that patient has been accepted for placement, "Patient accepted by [facility] at [facility] for the service of Dr. Oliva. Report to be called to [phone].". Transport scheduled for 3pm.   "

## 2022-07-11 LAB
AMPHETAMINES SERPL QL: NEGATIVE
BARBITURATES SERPL QL SCN: NEGATIVE
BENZODIAZ SERPL QL SCN: NEGATIVE
BZE SERPL QL: NEGATIVE
CARBOXYTHC SERPL QL SCN: POSITIVE
ETHANOL SERPL QL SCN: NEGATIVE
METHADONE SERPL QL SCN: NEGATIVE
OPIATES SERPL QL SCN: NEGATIVE
PCP SERPL QL SCN: NEGATIVE
PROPOXYPH SERPL QL: NEGATIVE

## 2022-07-12 LAB — VIT B1 BLD-MCNC: 70 UG/L (ref 38–122)

## 2022-07-16 ENCOUNTER — HOSPITAL ENCOUNTER (EMERGENCY)
Facility: HOSPITAL | Age: 42
Discharge: HOME | End: 2022-07-17
Attending: EMERGENCY MEDICINE
Payer: MEDICAID

## 2022-07-16 DIAGNOSIS — E83.42 HYPOMAGNESEMIA: ICD-10-CM

## 2022-07-16 DIAGNOSIS — R00.0 TACHYCARDIA: Primary | ICD-10-CM

## 2022-07-16 DIAGNOSIS — R07.9 CHEST PAIN: ICD-10-CM

## 2022-07-16 LAB
ALBUMIN SERPL BCP-MCNC: 3.7 G/DL (ref 3.5–5.2)
ALP SERPL-CCNC: 99 U/L (ref 55–135)
ALT SERPL W/O P-5'-P-CCNC: 67 U/L (ref 10–44)
AMPHET+METHAMPHET UR QL: NEGATIVE
ANION GAP SERPL CALC-SCNC: 12 MMOL/L (ref 8–16)
AST SERPL-CCNC: 84 U/L (ref 10–40)
BARBITURATES UR QL SCN>200 NG/ML: NEGATIVE
BASOPHILS # BLD AUTO: 0.06 K/UL (ref 0–0.2)
BASOPHILS NFR BLD: 0.5 % (ref 0–1.9)
BENZODIAZ UR QL SCN>200 NG/ML: NEGATIVE
BILIRUB SERPL-MCNC: 0.3 MG/DL (ref 0.1–1)
BILIRUB UR QL STRIP: NEGATIVE
BNP SERPL-MCNC: 24 PG/ML (ref 0–99)
BUN SERPL-MCNC: 13 MG/DL (ref 6–20)
BZE UR QL SCN: NEGATIVE
CALCIUM SERPL-MCNC: 9.1 MG/DL (ref 8.7–10.5)
CANNABINOIDS UR QL SCN: ABNORMAL
CHLORIDE SERPL-SCNC: 103 MMOL/L (ref 95–110)
CLARITY UR REFRACT.AUTO: CLEAR
CO2 SERPL-SCNC: 27 MMOL/L (ref 23–29)
COLOR UR AUTO: YELLOW
CREAT SERPL-MCNC: 1.1 MG/DL (ref 0.5–1.4)
CREAT UR-MCNC: 57 MG/DL (ref 23–375)
CTP QC/QA: YES
DIFFERENTIAL METHOD: ABNORMAL
EOSINOPHIL # BLD AUTO: 0.4 K/UL (ref 0–0.5)
EOSINOPHIL NFR BLD: 3.4 % (ref 0–8)
ERYTHROCYTE [DISTWIDTH] IN BLOOD BY AUTOMATED COUNT: 14.5 % (ref 11.5–14.5)
EST. GFR  (AFRICAN AMERICAN): >60 ML/MIN/1.73 M^2
EST. GFR  (NON AFRICAN AMERICAN): >60 ML/MIN/1.73 M^2
GLUCOSE SERPL-MCNC: 78 MG/DL (ref 70–110)
GLUCOSE UR QL STRIP: NEGATIVE
HCT VFR BLD AUTO: 28.5 % (ref 40–54)
HGB BLD-MCNC: 9.3 G/DL (ref 14–18)
HGB UR QL STRIP: NEGATIVE
IMM GRANULOCYTES # BLD AUTO: 0.07 K/UL (ref 0–0.04)
IMM GRANULOCYTES NFR BLD AUTO: 0.6 % (ref 0–0.5)
KETONES UR QL STRIP: NEGATIVE
LEUKOCYTE ESTERASE UR QL STRIP: NEGATIVE
LYMPHOCYTES # BLD AUTO: 1.8 K/UL (ref 1–4.8)
LYMPHOCYTES NFR BLD: 16.2 % (ref 18–48)
MAGNESIUM SERPL-MCNC: 1.3 MG/DL (ref 1.6–2.6)
MCH RBC QN AUTO: 31.1 PG (ref 27–31)
MCHC RBC AUTO-ENTMCNC: 32.6 G/DL (ref 32–36)
MCV RBC AUTO: 95 FL (ref 82–98)
METHADONE UR QL SCN>300 NG/ML: NEGATIVE
MONOCYTES # BLD AUTO: 1.6 K/UL (ref 0.3–1)
MONOCYTES NFR BLD: 13.9 % (ref 4–15)
NEUTROPHILS # BLD AUTO: 7.4 K/UL (ref 1.8–7.7)
NEUTROPHILS NFR BLD: 65.4 % (ref 38–73)
NITRITE UR QL STRIP: NEGATIVE
NRBC BLD-RTO: 0 /100 WBC
OPIATES UR QL SCN: NEGATIVE
PCP UR QL SCN>25 NG/ML: NEGATIVE
PH UR STRIP: 7 [PH] (ref 5–8)
PHOSPHATE SERPL-MCNC: 3.7 MG/DL (ref 2.7–4.5)
PLATELET # BLD AUTO: 309 K/UL (ref 150–450)
PMV BLD AUTO: 9 FL (ref 9.2–12.9)
POTASSIUM SERPL-SCNC: 4.4 MMOL/L (ref 3.5–5.1)
PROT SERPL-MCNC: 6.9 G/DL (ref 6–8.4)
PROT UR QL STRIP: NEGATIVE
RBC # BLD AUTO: 2.99 M/UL (ref 4.6–6.2)
SARS-COV-2 RDRP RESP QL NAA+PROBE: NEGATIVE
SODIUM SERPL-SCNC: 142 MMOL/L (ref 136–145)
SP GR UR STRIP: 1.01 (ref 1–1.03)
TOXICOLOGY INFORMATION: ABNORMAL
TROPONIN I SERPL DL<=0.01 NG/ML-MCNC: 0.02 NG/ML (ref 0–0.03)
TSH SERPL DL<=0.005 MIU/L-ACNC: 0.84 UIU/ML (ref 0.4–4)
URN SPEC COLLECT METH UR: NORMAL
WBC # BLD AUTO: 11.35 K/UL (ref 3.9–12.7)

## 2022-07-16 PROCEDURE — U0002 COVID-19 LAB TEST NON-CDC: HCPCS | Performed by: STUDENT IN AN ORGANIZED HEALTH CARE EDUCATION/TRAINING PROGRAM

## 2022-07-16 PROCEDURE — 83880 ASSAY OF NATRIURETIC PEPTIDE: CPT | Performed by: STUDENT IN AN ORGANIZED HEALTH CARE EDUCATION/TRAINING PROGRAM

## 2022-07-16 PROCEDURE — 83735 ASSAY OF MAGNESIUM: CPT | Performed by: STUDENT IN AN ORGANIZED HEALTH CARE EDUCATION/TRAINING PROGRAM

## 2022-07-16 PROCEDURE — 84443 ASSAY THYROID STIM HORMONE: CPT | Performed by: STUDENT IN AN ORGANIZED HEALTH CARE EDUCATION/TRAINING PROGRAM

## 2022-07-16 PROCEDURE — 80307 DRUG TEST PRSMV CHEM ANLYZR: CPT | Performed by: STUDENT IN AN ORGANIZED HEALTH CARE EDUCATION/TRAINING PROGRAM

## 2022-07-16 PROCEDURE — 85025 COMPLETE CBC W/AUTO DIFF WBC: CPT | Performed by: STUDENT IN AN ORGANIZED HEALTH CARE EDUCATION/TRAINING PROGRAM

## 2022-07-16 PROCEDURE — 99285 EMERGENCY DEPT VISIT HI MDM: CPT | Mod: CS,,, | Performed by: EMERGENCY MEDICINE

## 2022-07-16 PROCEDURE — 80053 COMPREHEN METABOLIC PANEL: CPT | Performed by: STUDENT IN AN ORGANIZED HEALTH CARE EDUCATION/TRAINING PROGRAM

## 2022-07-16 PROCEDURE — 93005 ELECTROCARDIOGRAM TRACING: CPT

## 2022-07-16 PROCEDURE — 63600175 PHARM REV CODE 636 W HCPCS: Performed by: STUDENT IN AN ORGANIZED HEALTH CARE EDUCATION/TRAINING PROGRAM

## 2022-07-16 PROCEDURE — 81003 URINALYSIS AUTO W/O SCOPE: CPT | Performed by: STUDENT IN AN ORGANIZED HEALTH CARE EDUCATION/TRAINING PROGRAM

## 2022-07-16 PROCEDURE — 84100 ASSAY OF PHOSPHORUS: CPT | Performed by: STUDENT IN AN ORGANIZED HEALTH CARE EDUCATION/TRAINING PROGRAM

## 2022-07-16 PROCEDURE — 93010 EKG 12-LEAD: ICD-10-PCS | Mod: ,,, | Performed by: INTERNAL MEDICINE

## 2022-07-16 PROCEDURE — 99285 PR EMERGENCY DEPT VISIT,LEVEL V: ICD-10-PCS | Mod: CS,,, | Performed by: EMERGENCY MEDICINE

## 2022-07-16 PROCEDURE — 99285 EMERGENCY DEPT VISIT HI MDM: CPT | Mod: 25

## 2022-07-16 PROCEDURE — 84484 ASSAY OF TROPONIN QUANT: CPT | Performed by: STUDENT IN AN ORGANIZED HEALTH CARE EDUCATION/TRAINING PROGRAM

## 2022-07-16 PROCEDURE — 93010 ELECTROCARDIOGRAM REPORT: CPT | Mod: ,,, | Performed by: INTERNAL MEDICINE

## 2022-07-16 RX ORDER — MAGNESIUM SULFATE 1 G/100ML
1 INJECTION INTRAVENOUS
Status: COMPLETED | OUTPATIENT
Start: 2022-07-16 | End: 2022-07-16

## 2022-07-16 RX ADMIN — SODIUM CHLORIDE, SODIUM LACTATE, POTASSIUM CHLORIDE, AND CALCIUM CHLORIDE 1000 ML: .6; .31; .03; .02 INJECTION, SOLUTION INTRAVENOUS at 08:07

## 2022-07-16 RX ADMIN — MAGNESIUM SULFATE HEPTAHYDRATE 1 G: 500 INJECTION, SOLUTION INTRAMUSCULAR; INTRAVENOUS at 09:07

## 2022-07-16 RX ADMIN — SODIUM CHLORIDE, SODIUM LACTATE, POTASSIUM CHLORIDE, AND CALCIUM CHLORIDE 1000 ML: .6; .31; .03; .02 INJECTION, SOLUTION INTRAVENOUS at 11:07

## 2022-07-16 NOTE — PROVIDER PROGRESS NOTES - EMERGENCY DEPT.
Encounter Date: 7/16/2022    ED Physician Progress Notes         EKG - STEMI Decision  Initial Reading: No STEMI present.

## 2022-07-17 VITALS
BODY MASS INDEX: 17.74 KG/M2 | TEMPERATURE: 99 F | HEIGHT: 71 IN | DIASTOLIC BLOOD PRESSURE: 91 MMHG | SYSTOLIC BLOOD PRESSURE: 159 MMHG | WEIGHT: 126.75 LBS | RESPIRATION RATE: 19 BRPM | OXYGEN SATURATION: 100 % | HEART RATE: 104 BPM

## 2022-07-17 PROCEDURE — 25500020 PHARM REV CODE 255: Performed by: EMERGENCY MEDICINE

## 2022-07-17 RX ADMIN — IOHEXOL 100 ML: 350 INJECTION, SOLUTION INTRAVENOUS at 03:07

## 2022-07-17 NOTE — DISCHARGE INSTRUCTIONS
It was a pleasure taking care of you today!     Diagnosis: Rapid Heart Beat  -your CT today showed some pulmonary nodules.  The report is below.  Recommend following up in 6-12 months for repeat CT scan  -continue medications as prescribed  -return to the ED if you have any more episodes of rapid heartbeat    Follow-Up Plan:  - Follow-up with primary care doctor within 3 - 5 days to discuss your recent ER visit and any additional concerns that you may have.  - Additional testing and/or evaluation as directed by your primary doctor    Return to the Emergency Department for symptoms including but not limited to: persistence or worsening of symptoms, shortness of breath or chest pain, inability to drink without vomiting, passing out/fainting/ loss of consciousness, or if you have other concerns.     CTA Chest 7/17/22  Impression:     Suboptimal contrast bolus timing for evaluation for pulmonary thromboembolism, nondiagnostic.  No large saddle embolus.  No evidence of right heart strain or pulmonary infarct.     Multiple scattered subcentimeter ground-glass nodules in the bilateral lower lobes.  Findings could be seen the setting of infection or inflammatory etiologies.  For a ground glass nodule 6 mm or larger, Fleischner Society 2017 guidelines recommend follow up with non-contrast chest CT at 6-12 months after discovery. If this nodule persists at that time, additional follow up with non-contrast chest CT is recommended every 2 years until 5 years of stability have been documented.

## 2022-07-17 NOTE — ED PROVIDER NOTES
Encounter Date: 7/16/2022       History     Chief Complaint   Patient presents with    Leg Swelling     Just released form [facility]. Just started taking Respiradol. Now reports BLE edema.      Mr. Morales is a 41-year-old male with past medical history of anxiety who presents to the emergency department due to multiple concerns. Patient states that he had taken his Risperdal medication about 8 hours ago. He states that he believes he may have taken it too fast but shortly after that he stated that he had smoked some weed. Patient states that he started having discomfort in his chest, he stated that his heart was beating out of his chest and he was feeling very short of breath. He also stated that he felt lightheaded and so he wanted to come to the emergency department for evaluation.  Patient also stated that over the past few days he has noticed increased swelling in his legs.  He denies fever, chills, nausea, vomiting or abdominal pain.         Review of patient's allergies indicates:  No Known Allergies  History reviewed. No pertinent past medical history.  History reviewed. No pertinent surgical history.  Family History   Family history unknown: Yes     Social History     Tobacco Use    Smoking status: Never Smoker    Smokeless tobacco: Never Used   Substance Use Topics    Alcohol use: Not Currently     Comment: Quit 3 years ago    Drug use: Yes     Types: Marijuana     Review of Systems   Constitutional: Negative for activity change, appetite change, chills, fatigue and fever.   HENT: Negative for congestion, drooling, ear pain, postnasal drip, rhinorrhea, sinus pressure, sinus pain, sore throat and trouble swallowing.    Eyes: Negative for photophobia, pain and visual disturbance.   Respiratory: Positive for chest tightness and shortness of breath. Negative for apnea, cough, choking and wheezing.    Cardiovascular: Positive for palpitations and leg swelling.   Gastrointestinal: Negative for  abdominal pain, blood in stool, constipation, diarrhea, nausea and vomiting.   Genitourinary: Negative for difficulty urinating, dysuria, flank pain, penile discharge and scrotal swelling.   Musculoskeletal: Negative for arthralgias, back pain, gait problem, joint swelling and myalgias.   Skin: Negative for color change and wound.   Neurological: Positive for light-headedness. Negative for dizziness, seizures, facial asymmetry, weakness, numbness and headaches.   Psychiatric/Behavioral: Negative for agitation and confusion. The patient is not nervous/anxious.        Physical Exam     Initial Vitals [07/16/22 1849]   BP Pulse Resp Temp SpO2   139/82 (!) 132 16 98.4 °F (36.9 °C) 99 %      MAP       --         Physical Exam    Nursing note and vitals reviewed.  Constitutional: He appears well-developed and well-nourished. He is not diaphoretic. No distress.   HENT:   Head: Normocephalic and atraumatic.   Mouth/Throat: Oropharynx is clear and moist. No oropharyngeal exudate.   Eyes: Conjunctivae and EOM are normal. Pupils are equal, round, and reactive to light. Right eye exhibits no discharge. Left eye exhibits no discharge. No scleral icterus.   Neck: No tracheal deviation present. No JVD present.   Normal range of motion.  Cardiovascular: Normal heart sounds and intact distal pulses. Tachycardia present.  Exam reveals no gallop.    No murmur heard.  Pulmonary/Chest: Breath sounds normal. No respiratory distress. He has no wheezes. He has no rales. He exhibits no tenderness.   Abdominal: Abdomen is soft. Bowel sounds are normal. He exhibits no distension. There is no abdominal tenderness. There is no guarding.   Musculoskeletal:         General: Edema present. No tenderness.      Cervical back: Normal range of motion.     Neurological: He is alert and oriented to person, place, and time. He has normal strength. He displays normal reflexes. No cranial nerve deficit or sensory deficit.   Skin: Skin is warm. Capillary  refill takes less than 2 seconds. No rash noted. No erythema. No pallor.   Psychiatric: He has a normal mood and affect.         ED Course   Procedures  Labs Reviewed   CBC W/ AUTO DIFFERENTIAL - Abnormal; Notable for the following components:       Result Value    RBC 2.99 (*)     Hemoglobin 9.3 (*)     Hematocrit 28.5 (*)     MCH 31.1 (*)     MPV 9.0 (*)     Immature Granulocytes 0.6 (*)     Immature Grans (Abs) 0.07 (*)     Mono # 1.6 (*)     Lymph % 16.2 (*)     All other components within normal limits   COMPREHENSIVE METABOLIC PANEL - Abnormal; Notable for the following components:    AST 84 (*)     ALT 67 (*)     All other components within normal limits   MAGNESIUM - Abnormal; Notable for the following components:    Magnesium 1.3 (*)     All other components within normal limits   DRUG SCREEN PANEL, URINE EMERGENCY - Abnormal; Notable for the following components:    THC Presumptive Positive (*)     All other components within normal limits    Narrative:     Specimen Source->Urine   URINALYSIS, REFLEX TO URINE CULTURE    Narrative:     Specimen Source->Urine   TSH   TROPONIN I   PHOSPHORUS   B-TYPE NATRIURETIC PEPTIDE   SARS-COV-2 RDRP GENE     EKG Readings: (Independently Interpreted)   Initial Reading: No STEMI. Rhythm: Sinus Tachycardia. Heart Rate: 130. Conduction: Normal.       Imaging Results           CTA Chest Non-Coronary (PE Study) (Final result)  Result time 07/17/22 03:54:25    Final result by Luiz Parson MD (07/17/22 03:54:25)                 Impression:      Suboptimal contrast bolus timing for evaluation for pulmonary thromboembolism, nondiagnostic.  No large saddle embolus.  No evidence of right heart strain or pulmonary infarct.    Multiple scattered subcentimeter ground-glass nodules in the bilateral lower lobes.  Findings could be seen the setting of infection or inflammatory etiologies.  For a ground glass nodule 6 mm or larger, Fleischner Society 2017 guidelines recommend follow  up with non-contrast chest CT at 6-12 months after discovery. If this nodule persists at that time, additional follow up with non-contrast chest CT is recommended every 2 years until 5 years of stability have been documented.    This report was flagged in Epic as abnormal.    Electronically signed by resident: Dmitry Bess  Date:    07/17/2022  Time:    03:33    Electronically signed by: Luiz Parson MD  Date:    07/17/2022  Time:    03:54             Narrative:    EXAMINATION:  CTA CHEST NON CORONARY    CLINICAL HISTORY:  Pulmonary embolism (PE) suspected, high prob;    TECHNIQUE:  Low dose axial images, sagittal and coronal reformations were obtained from the thoracic inlet to the lung bases following the IV administration of 100 mL of Omnipaque 350.  Contrast timing was optimized to evaluate the pulmonary arteries.    COMPARISON:  Chest radiograph 07/16/2022, 07/08/2022    FINDINGS:  No convincing evidence of abnormality at the base of the neck.  There is left-sided arch with 3 branch vessels.  Aorta maintains normal caliber, contour and course.    Suboptimal contrast bolus timing limits evaluation for thromboembolism.  Pulmonary arteries distribute normally.  No saddle embolism.  No evidence of interventricular bowing.    There is no pleural or pericardial fluid and no lymph node enlargement.    Esophagus maintains normal caliber and course.  No bowel distension, free air, biliary dilatation or other significant abnormality involving structures in the upper abdomen.  No significant osseous abnormality and no significant abnormality of the extrathoracic soft tissues.    Tracheobronchial tree reveals no significant abnormality.    Lungs are symmetrically expanded.  Scattered ground-glass nodules are present within the bilateral lower lobes (series 301, image 271, 292, and 393).                               X-Ray Chest AP Portable (Final result)  Result time 07/16/22 20:07:58    Final result by Luiz SY  MD Eb (07/16/22 20:07:58)                 Impression:      No acute findings.    No significant change from prior study.      Electronically signed by: Luiz Parson MD  Date:    07/16/2022  Time:    20:07             Narrative:    EXAMINATION:  XR CHEST AP PORTABLE    CLINICAL HISTORY:  Chest pain, unspecified    TECHNIQUE:  Single frontal view of the chest was performed.    COMPARISON:  07/08/2022.    FINDINGS:  Heart and lungs  appear unchanged when allowing for differences in technique and positioning.                                 Medications   magnesium sulfate in dextrose IVPB (premix) 1 g (0 g Intravenous Stopped 7/16/22 2239)   lactated ringers bolus 1,000 mL (0 mLs Intravenous Stopped 7/16/22 2239)   lactated ringers bolus 1,000 mL (0 mLs Intravenous Stopped 7/17/22 0038)   iohexoL (OMNIPAQUE 350) injection 100 mL (100 mLs Intravenous Given 7/17/22 0318)     Medical Decision Making:   History:   Old Medical Records: I decided to obtain old medical records.  Old Records Summarized: records from previous admission(s).  Initial Assessment:   Mr. Morales is a 41-year-old male with past medical history of anxiety who presents to the emergency department due to multiple concerns.   Differential Diagnosis:   Medication side effect  Thyroid disease  Pulmonary embolism  Anxiety   ED Management:  Patient was examined,  Lab workup was only significant for marijuana on the drug screen and hypomagnesemia which was treated.   Patient received fluids but on reassessment he was still tachycardic in the 120s  Patient then began to state that he was feeling short of breath and so he was given another L of fluids and put in for CTPE  Patient was signed out to incoming team pending his CTPE             ED Course as of 07/17/22 0841   Sat Jul 16, 2022 2116 Sinus tachycardia rate of 130 with no ischemic changes, normal axis, intervals unremarkable. [BA]      ED Course User Index  [BA] Scott Richey MD              Clinical Impression:   Final diagnoses:  [R00.0] Tachycardia (Primary)  [R07.9] Chest pain  [E83.42] Hypomagnesemia          ED Disposition Condition    Discharge         ED Prescriptions     None        Follow-up Information    None          Alyce Jaramillo MD  Resident  07/17/22 0848

## 2022-07-18 NOTE — PROVIDER PROGRESS NOTES - EMERGENCY DEPT.
Patient was signed out to me by Dr. Richey pending CTPE. Briefly, this is a 40yo M presenting with multiple concerns, found to be tachycardic and had low magnesium.    PE:   Const: Awake and alert, NAD  Resp: Even and unlabored  Neuro: Moves all extremities equally  Psych: Denies SI/HI, denies hallucinations    Data:  Results for orders placed or performed during the hospital encounter of 07/16/22   CBC auto differential   Result Value Ref Range    WBC 11.35 3.90 - 12.70 K/uL    RBC 2.99 (L) 4.60 - 6.20 M/uL    Hemoglobin 9.3 (L) 14.0 - 18.0 g/dL    Hematocrit 28.5 (L) 40.0 - 54.0 %    MCV 95 82 - 98 fL    MCH 31.1 (H) 27.0 - 31.0 pg    MCHC 32.6 32.0 - 36.0 g/dL    RDW 14.5 11.5 - 14.5 %    Platelets 309 150 - 450 K/uL    MPV 9.0 (L) 9.2 - 12.9 fL    Immature Granulocytes 0.6 (H) 0.0 - 0.5 %    Gran # (ANC) 7.4 1.8 - 7.7 K/uL    Immature Grans (Abs) 0.07 (H) 0.00 - 0.04 K/uL    Lymph # 1.8 1.0 - 4.8 K/uL    Mono # 1.6 (H) 0.3 - 1.0 K/uL    Eos # 0.4 0.0 - 0.5 K/uL    Baso # 0.06 0.00 - 0.20 K/uL    nRBC 0 0 /100 WBC    Gran % 65.4 38.0 - 73.0 %    Lymph % 16.2 (L) 18.0 - 48.0 %    Mono % 13.9 4.0 - 15.0 %    Eosinophil % 3.4 0.0 - 8.0 %    Basophil % 0.5 0.0 - 1.9 %    Differential Method Automated    Comprehensive metabolic panel   Result Value Ref Range    Sodium 142 136 - 145 mmol/L    Potassium 4.4 3.5 - 5.1 mmol/L    Chloride 103 95 - 110 mmol/L    CO2 27 23 - 29 mmol/L    Glucose 78 70 - 110 mg/dL    BUN 13 6 - 20 mg/dL    Creatinine 1.1 0.5 - 1.4 mg/dL    Calcium 9.1 8.7 - 10.5 mg/dL    Total Protein 6.9 6.0 - 8.4 g/dL    Albumin 3.7 3.5 - 5.2 g/dL    Total Bilirubin 0.3 0.1 - 1.0 mg/dL    Alkaline Phosphatase 99 55 - 135 U/L    AST 84 (H) 10 - 40 U/L    ALT 67 (H) 10 - 44 U/L    Anion Gap 12 8 - 16 mmol/L    eGFR if African American >60.0 >60 mL/min/1.73 m^2    eGFR if non African American >60.0 >60 mL/min/1.73 m^2   Urinalysis, Reflex to Urine Culture Urine, Clean Catch    Specimen: Urine   Result Value Ref  Range    Specimen UA Urine, Clean Catch     Color, UA Yellow Yellow, Straw, Araceli    Appearance, UA Clear Clear    pH, UA 7.0 5.0 - 8.0    Specific Gravity, UA 1.015 1.005 - 1.030    Protein, UA Negative Negative    Glucose, UA Negative Negative    Ketones, UA Negative Negative    Bilirubin (UA) Negative Negative    Occult Blood UA Negative Negative    Nitrite, UA Negative Negative    Leukocytes, UA Negative Negative   TSH   Result Value Ref Range    TSH 0.836 0.400 - 4.000 uIU/mL   Troponin I   Result Value Ref Range    Troponin I 0.019 0.000 - 0.026 ng/mL   Magnesium   Result Value Ref Range    Magnesium 1.3 (L) 1.6 - 2.6 mg/dL   Phosphorus   Result Value Ref Range    Phosphorus 3.7 2.7 - 4.5 mg/dL   Brain natriuretic peptide   Result Value Ref Range    BNP 24 0 - 99 pg/mL   Drug screen panel, emergency   Result Value Ref Range    Benzodiazepines Negative Negative    Methadone metabolites Negative Negative    Cocaine (Metab.) Negative Negative    Opiate Scrn, Ur Negative Negative    Barbiturate Screen, Ur Negative Negative    Amphetamine Screen, Ur Negative Negative    THC Presumptive Positive (A) Negative    Phencyclidine Negative Negative    Creatinine, Urine 57.0 23.0 - 375.0 mg/dL    Toxicology Information SEE COMMENT    POCT COVID-19 Rapid Screening   Result Value Ref Range    POC Rapid COVID Negative Negative     Acceptable Yes       Imaging Results           CTA Chest Non-Coronary (PE Study) (Final result)  Result time 07/17/22 03:54:25    Final result by Luiz Parson MD (07/17/22 03:54:25)                 Impression:      Suboptimal contrast bolus timing for evaluation for pulmonary thromboembolism, nondiagnostic.  No large saddle embolus.  No evidence of right heart strain or pulmonary infarct.    Multiple scattered subcentimeter ground-glass nodules in the bilateral lower lobes.  Findings could be seen the setting of infection or inflammatory etiologies.  For a ground glass nodule 6  mm or larger, Fleischner Society 2017 guidelines recommend follow up with non-contrast chest CT at 6-12 months after discovery. If this nodule persists at that time, additional follow up with non-contrast chest CT is recommended every 2 years until 5 years of stability have been documented.    This report was flagged in Epic as abnormal.    Electronically signed by resident: Dmitry Bess  Date:    07/17/2022  Time:    03:33    Electronically signed by: Luiz Parson MD  Date:    07/17/2022  Time:    03:54             Narrative:    EXAMINATION:  CTA CHEST NON CORONARY    CLINICAL HISTORY:  Pulmonary embolism (PE) suspected, high prob;    TECHNIQUE:  Low dose axial images, sagittal and coronal reformations were obtained from the thoracic inlet to the lung bases following the IV administration of 100 mL of Omnipaque 350.  Contrast timing was optimized to evaluate the pulmonary arteries.    COMPARISON:  Chest radiograph 07/16/2022, 07/08/2022    FINDINGS:  No convincing evidence of abnormality at the base of the neck.  There is left-sided arch with 3 branch vessels.  Aorta maintains normal caliber, contour and course.    Suboptimal contrast bolus timing limits evaluation for thromboembolism.  Pulmonary arteries distribute normally.  No saddle embolism.  No evidence of interventricular bowing.    There is no pleural or pericardial fluid and no lymph node enlargement.    Esophagus maintains normal caliber and course.  No bowel distension, free air, biliary dilatation or other significant abnormality involving structures in the upper abdomen.  No significant osseous abnormality and no significant abnormality of the extrathoracic soft tissues.    Tracheobronchial tree reveals no significant abnormality.    Lungs are symmetrically expanded.  Scattered ground-glass nodules are present within the bilateral lower lobes (series 301, image 271, 292, and 393).                               X-Ray Chest AP Portable (Final  result)  Result time 07/16/22 20:07:58    Final result by Luiz Parson MD (07/16/22 20:07:58)                 Impression:      No acute findings.    No significant change from prior study.      Electronically signed by: Luiz Parson MD  Date:    07/16/2022  Time:    20:07             Narrative:    EXAMINATION:  XR CHEST AP PORTABLE    CLINICAL HISTORY:  Chest pain, unspecified    TECHNIQUE:  Single frontal view of the chest was performed.    COMPARISON:  07/08/2022.    FINDINGS:  Heart and lungs  appear unchanged when allowing for differences in technique and positioning.                                 MDM:   CTA chest negative for PE. His tachycardia significantly improved, down to 104 ST on the monitor while I was in the room, patient asymptomatic. Suspect side effect of taking an extra dose of risperdal while he felt like he was having a panic attack yesterday. Considered secondary to substance abuse given +MJ and history +meth. No signs of ETOH withdrawal and denies ETOH x2 years. Patient instructed to follow up with psychiatrist and PCP re: ground glass nodules on CT. Stable for DC home.

## 2022-07-20 NOTE — HOSPITAL COURSE
Patient admitted for acute encephalopathy, likely attributable to substance abuse and possible underlying psychiatric disorder (undiagnosed) now somewhat improved. Also had acute renal failure with CPK elevation, both of which were resolving with IV fluids. Evaluated by psychiatry who recommended inpatient psych treatment.  Patient was discharged in a stable condition to  psych.  While he had no focal neurology deficits if delusions etc continue would obtain MRI brain.
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
## 2022-10-22 ENCOUNTER — HOSPITAL ENCOUNTER (EMERGENCY)
Facility: OTHER | Age: 42
Discharge: HOME | End: 2022-10-22
Attending: EMERGENCY MEDICINE
Payer: MEDICAID

## 2022-10-22 VITALS
TEMPERATURE: 98 F | RESPIRATION RATE: 18 BRPM | HEART RATE: 93 BPM | WEIGHT: 135 LBS | DIASTOLIC BLOOD PRESSURE: 95 MMHG | SYSTOLIC BLOOD PRESSURE: 145 MMHG | BODY MASS INDEX: 18.9 KG/M2 | HEIGHT: 71 IN | OXYGEN SATURATION: 100 %

## 2022-10-22 DIAGNOSIS — T07.XXXA MULTIPLE ABRASIONS: ICD-10-CM

## 2022-10-22 DIAGNOSIS — S50.02XA CONTUSION OF LEFT ELBOW, INITIAL ENCOUNTER: Primary | ICD-10-CM
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
## 2022-10-22 DIAGNOSIS — S50.01XA CONTUSION OF RIGHT ELBOW, INITIAL ENCOUNTER: ICD-10-CM

## 2022-10-22 PROCEDURE — 63600175 PHARM REV CODE 636 W HCPCS: Performed by: PHYSICIAN ASSISTANT

## 2022-10-22 PROCEDURE — 90471 IMMUNIZATION ADMIN: CPT | Performed by: PHYSICIAN ASSISTANT

## 2022-10-22 PROCEDURE — 90715 TDAP VACCINE 7 YRS/> IM: CPT | Performed by: PHYSICIAN ASSISTANT

## 2022-10-22 PROCEDURE — 25000003 PHARM REV CODE 250: Performed by: PHYSICIAN ASSISTANT

## 2022-10-22 PROCEDURE — 99284 EMERGENCY DEPT VISIT MOD MDM: CPT | Mod: 25

## 2022-10-22 RX ORDER — MUPIROCIN 20 MG/G
1 OINTMENT TOPICAL
Status: COMPLETED | OUTPATIENT
Start: 2022-10-22 | End: 2022-10-22

## 2022-10-22 RX ORDER — IBUPROFEN 600 MG/1
600 TABLET ORAL
Status: COMPLETED | OUTPATIENT
Start: 2022-10-22 | End: 2022-10-22

## 2022-10-22 RX ORDER — ACETAMINOPHEN 500 MG
1000 TABLET ORAL
Status: COMPLETED | OUTPATIENT
Start: 2022-10-22 | End: 2022-10-22

## 2022-10-22 RX ADMIN — ACETAMINOPHEN 1000 MG: 500 TABLET, FILM COATED ORAL at 06:10

## 2022-10-22 RX ADMIN — MUPIROCIN 22 G: 20 OINTMENT TOPICAL at 06:10

## 2022-10-22 RX ADMIN — IBUPROFEN 600 MG: 600 TABLET, FILM COATED ORAL at 06:10

## 2022-10-22 RX ADMIN — CLOSTRIDIUM TETANI TOXOID ANTIGEN (FORMALDEHYDE INACTIVATED), CORYNEBACTERIUM DIPHTHERIAE TOXOID ANTIGEN (FORMALDEHYDE INACTIVATED), BORDETELLA PERTUSSIS TOXOID ANTIGEN (GLUTARALDEHYDE INACTIVATED), BORDETELLA PERTUSSIS FILAMENTOUS HEMAGGLUTININ ANTIGEN (FORMALDEHYDE INACTIVATED), BORDETELLA PERTUSSIS PERTACTIN ANTIGEN, AND BORDETELLA PERTUSSIS FIMBRIAE 2/3 ANTIGEN 0.5 ML: 5; 2; 2.5; 5; 3; 5 INJECTION, SUSPENSION INTRAMUSCULAR at 06:10
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
